# Patient Record
Sex: FEMALE | Race: WHITE
[De-identification: names, ages, dates, MRNs, and addresses within clinical notes are randomized per-mention and may not be internally consistent; named-entity substitution may affect disease eponyms.]

---

## 2018-06-02 ENCOUNTER — HOSPITAL ENCOUNTER (EMERGENCY)
Dept: HOSPITAL 75 - ER | Age: 28
Discharge: LEFT BEFORE DISCHARGE | End: 2018-06-02
Payer: SELF-PAY

## 2018-06-02 VITALS — SYSTOLIC BLOOD PRESSURE: 120 MMHG | DIASTOLIC BLOOD PRESSURE: 82 MMHG

## 2018-06-02 VITALS — HEIGHT: 60 IN | BODY MASS INDEX: 30.43 KG/M2 | WEIGHT: 155 LBS

## 2018-06-02 DIAGNOSIS — F11.23: Primary | ICD-10-CM

## 2018-06-02 DIAGNOSIS — Z88.0: ICD-10-CM

## 2018-06-02 DIAGNOSIS — F12.90: ICD-10-CM

## 2018-06-02 DIAGNOSIS — F17.210: ICD-10-CM

## 2018-06-02 LAB
APTT PPP: YELLOW S
BACTERIA #/AREA URNS HPF: (no result) /HPF
BARBITURATES UR QL: NEGATIVE
BENZODIAZ UR QL SCN: POSITIVE
BILIRUB UR QL STRIP: NEGATIVE
COCAINE UR QL: NEGATIVE
FIBRINOGEN PPP-MCNC: (no result) MG/DL
GLUCOSE UR STRIP-MCNC: NEGATIVE MG/DL
KETONES UR QL STRIP: NEGATIVE
LEUKOCYTE ESTERASE UR QL STRIP: NEGATIVE
METHADONE UR QL SCN: NEGATIVE
METHAMPHETAMINE SCREEN URINE S: NEGATIVE
NITRITE UR QL STRIP: NEGATIVE
OPIATES UR QL SCN: NEGATIVE
OXYCODONE UR QL: NEGATIVE
PH UR STRIP: 7 [PH] (ref 5–9)
PROPOXYPH UR QL: NEGATIVE
PROT UR QL STRIP: NEGATIVE
RBC #/AREA URNS HPF: (no result) /HPF
SP GR UR STRIP: 1.01 (ref 1.02–1.02)
SQUAMOUS #/AREA URNS HPF: (no result) /HPF
TRICYCLICS UR QL SCN: NEGATIVE
UROBILINOGEN UR-MCNC: NORMAL MG/DL
WBC #/AREA URNS HPF: (no result) /HPF

## 2018-06-02 PROCEDURE — 99283 EMERGENCY DEPT VISIT LOW MDM: CPT

## 2018-06-02 PROCEDURE — 80306 DRUG TEST PRSMV INSTRMNT: CPT

## 2018-06-02 PROCEDURE — 81000 URINALYSIS NONAUTO W/SCOPE: CPT

## 2018-06-02 NOTE — XMS REPORT
Transition of Care/Referral Summary

 Created on: 2113



ISA SILVERIO

External Reference #: [id number]

: 1990

Sex: Female



Demographics







 Address  [address] [place]  [address]

 

 Home Phone  [phone]

 

 Preferred Language  English

 

 Marital Status  Single

 

 [employer] Affiliation  Mos[employer]

 

 Race  White

 

 Ethnic Group  Not  or 





Author







 Author  [facility], [facility], [facility]

 

 Organization  [facility] [place], [facility]

 

 Address  Unknown

 

 Phone  Unavailable







Care Team Providers







 Care Team Member Name  Role  Phone

 

 Ira Covarrubias  PCP  [phone]







Encounter





VC FIN [id number] Date(s): 5/11/15 - 5/11/15

[facility], [address] [address] [phone]

Discharge Diagnosis: VOMITING ALONE

Discharge Diagnosis: Epilepsy

Discharge Diagnosis: Abdominal pain

Discharge Disposition: 01-Home or Self Care

Attending Physician: Ira Covarrubias DO

Admitting Physician: Ira Covarrubias DO





Vital Signs







 



  Most recent to   1



  oldest [Reference 



  Range]: 

 

 



  Peripheral Pulse   104 bpm



  Rate [ bpm]   *HI*



   (5/11/15 3:03 PM)

 

 



  Blood Pressure   112/78 mmHg



  [/60-90 mmHg]   (5/11/15 3:03 PM)

 

 



  SpO2   98 %



   (5/11/15 3:03 PM)







Problem List







    



  Condition   Effective Dates   Status   Health Status   Informant

 

    



  Endometriosis(Confir    Active    patient



  med)    

 

    



  Epilepsy(Confirmed)    Active    patient

 

    



  Folliculitis(Confirm    Active  



  ed)    

 

    



  Obesity(Confirmed)    Active    patient

 

    



  Tobacco    Active    patient



  user(Confirmed)    







Allergies, Adverse Reactions, Alerts







   



  Substance   Reaction   Severity   Status

 

   



  acetaminophen   NAUSEA/VOMITING/ITCHING    Active

 

   



  amoxicillin   Adverse Reaction    Active



   UNKNOWN  

 

   



  HYDROcodone   NAUSEA/VOMITING/ITCHING    Active

 

   



  penicillin   Adverse Reaction    Active

 

   



  traMADol     Active







Medications





clonazePAM 1 mg oral tablet

1 tabs, Oral, BID, # 60 tabs, 1 Refill(s)

Start Date: 5/11/15

Stop Date: 7/10/15

Status: Ordered



Depakote

Oral, TID, 0 Refill(s)

Start Date: 2/2/15

Status: Ordered



Flomax 0.4 mg oral capsule

0.4 mg 1 caps, Oral, Daily, # 30 caps, 0 Refill(s), Pharmacy: [facility]/pharmacy #
[id number], 1 caps Oral Daily

Start Date: 5/21/15

Status: Ordered



Flonase 50 mcg/inh nasal spray

2 sprays, Nasal, Daily, in each nostril

Start Date: 2/9/15

Status: Ordered



ibuprofen 800 mg oral tablet

1 tabs, Oral, TID, as needed for pain, # 30 tabs, 0 Refill(s), SUPERVISING 
DEVIN GUZMAN

Start Date: 2/2/15

Stop Date: 2/12/15

Status: Ordered



Norco 7.5 mg-325 mg oral tablet

1 tabs, Oral, q6hr, as needed for pain, # 30 tabs, 0 Refill(s)

Start Date: 5/22/15

Status: Ordered



Norco 7.5 mg-325 mg oral tablet

1 tabs, Oral, q6hr, as needed for pain, # 30 tabs, 0 Refill(s)

Start Date: 5/19/15

Status: Ordered



promethazine 12.5 mg oral tablet

12.5 mg 1 tabs, Oral, q6hr, as needed for nausea/vomiting, # 60 tabs, 0 Refill(s
), Pharmacy: [facility]/pharmacy #[id number], 1 tabs Oral q6hr,PRN:as needed for nausea/
vomiting

Start Date: 5/19/15

Stop Date: 16

Status: Ordered



Zofran ODT 8 mg oral tablet, disintegrating

8 mg 1 tabs, Oral, q8hr, as needed for nausea/vomiting, # 30 tabs, 1 Refill(s), 
Pharmacy: [facility]/pharmacy #[id number], 1 tabs Oral q8hr,PRN:as needed for nausea/
vomiting

Start Date: 5/19/15

Status: Ordered



Results





Hematology





 



  Most recent to   1



  oldest [Reference 



  Range]: 

 

 



  WBC [4.8-10.8   8.7 10*3/uL



  10*3/uL]   (5/11/15 3:32 PM)

 

 



  RBC [4.00-5.20   4.44 10*6/uL



  10*6/uL]   (5/11/15 3:32 PM)

 

 



  Hgb [12.0-16.0   13.5 gm/dL



  gm/dL]   (5/11/15 3:32 PM)

 

 



  Hct [37.0-47.0 %]   39.2 %



   (5/11/15 3:32 PM)

 

 



  MCV [82.0-99.0 fL]   88.3 fL



   (5/11/15 3:32 PM)

 

 



  MCH [27.0-32.0 pg]   30.4 pg



   (5/11/15 3:32 PM)

 

 



  MCHC [32.0-36.0   34.4 gm/dL



  gm/dL]   (5/11/15 3:32 PM)

 

 



  RDW [11.5-14.5 %]   12.7 %



   (5/11/15 3:32 PM)

 

 



  Platelet [150-400   392 10*3/uL



  10*3/uL]   (5/11/15 3:32 PM)

 

 



  MPV [8.8-14.8 fL]   10.8 fL



   (5/11/15 3:32 PM)

 

 



  Immature   0.1 %



  Granulocytes   (5/11/15 3:32 PM)



  [0.0-1.0 %] 

 

 



  Neutrophils [51-75   39 %



  %]   *LOW*



   (5/11/15 3:32 PM)

 

 



  Lymphocytes [20-46   52 %



  %]   *HI*



   (5/11/15 3:32 PM)

 

 



  Monocytes [4-11 %]   7 %



   (5/11/15 3:32 PM)

 

 



  Eosinophils [0-4 %]   2 %



   (5/11/15 3:32 PM)

 

 



  Basophils [0-2 %]   0 %



   (5/11/15 3:32 PM)

 

 



  Neutro Absolute   3.41 10*3



  [1.90-7.00 10*3]   (5/11/15 3:32 PM)

 

 



  Lymph Absolute   4.50 10*3



  [0.80-3.30 10*3]   *HI*



   (5/11/15 3:32 PM)

 

 



  Mono Absolute   0.59 10*3



  [0.30-1.00 10*3]   (5/11/15 3:32 PM)

 

 



  Eos Absolute   0.14 10*3



  [0.00-0.50 10*3]   (5/11/15 3:32 PM)

 

 



  Baso Absolute   0.03 10*3



  [0.00-0.20 10*3]   (5/11/15 3:32 PM)







Chemistry





 



  Most recent to   1



  oldest [Reference 



  Range]: 

 

 



  Sodium Lvl [135-144   138 mEq/L



  mEq/L]   (5/11/15 3:32 PM)

 

 



  Potassium Lvl   4.2 mEq/L 1



  [3.5-5.2 mEq/L]   (5/11/15 3:32 PM)

 

 



  Chloride [   107 mEq/L



  mEq/L]   (5/11/15 3:32 PM)

 

 



  CO2 [22-31 mEq/L]   21 mEq/L



   *LOW*



   (5/11/15 3:32 PM)

 

 



  AGAP [3-20]   10



   (5/11/15 3:32 PM)

 

 



  BUN [7-19 mg/dL]   10 mg/dL



   (5/11/15 3:32 PM)

 

 



  Glucose Lvl [70-99   108 mg/dL



  mg/dL]   *HI*



   (5/11/15 3:32 PM)

 

 



  Creatinine Lvl   0.75 mg/dL



  [0.57-1.11 mg/dL]   (5/11/15 3:32 PM)

 

 



  eGFR [>60 mL/min]   >60 mL/min 2



   (5/11/15 3:32 PM)

 

 



  Calcium Lvl   9.2 mg/dL



  [8.9-10.5 mg/dL]   (5/11/15 3:32 PM)

 

 



  Albumin Lvl [3.5-5.0   3.7 gm/dL



  gm/dL]   (5/11/15 3:32 PM)

 

 



  Total Protein   6.2 gm/dL



  [6.4-8.3 gm/dL]   *LOW*



   (5/11/15 3:32 PM)

 

 



  Globulin [1.8-4.0   2.5 gm/dL



  gm/dL]   (5/11/15 3:32 PM)

 

 



  ALT [0-55 U/L]   13 U/L



   (5/11/15 3:32 PM)

 

 



  AST [5-34 U/L]   22 U/L



   (5/11/15 3:32 PM)

 

 



  Alk Phos [   61 U/L



  U/L]   (5/11/15 3:32 PM)

 

 



  Bili Total [0.2-1.2   0.3 mg/dL



  mg/dL]   (5/11/15 3:32 PM)

 

 



  Lipase Lvl [8-78   43 U/L



  U/L]   (5/11/15 3:32 PM)







1Result Comment: Specimen moderately hemolyzed. LDH, AST, Potassium and Direct

Bilirubin may be affected.



2Result Comment: Multiply eGFR results by 1.21 for  race.



Immunizations





No data available for this section



Procedures





No data available for this section



Social History







 



  Social History Type   Response

 

 



  Smoking Status   Current some day smoker; Type: Cigarettes







Assessment and Plan

Extracted from:





  



  Title: Ambulatory Patient Education   Author: Ira Covarrubias DO   Date: 5/12/15









 Family Medicine

Abdominal Pain, Women

Abdominal (stomach, pelvic, or belly ) pain can be caused by many things. It is 
important to tell your doctor:





  The location of the pain. 



  Does it come and go or is it present all the time? 



  Are there things that start the pain (eating certain foods, exercise)? 



  Are there other symptoms associated with the pain (fever, nausea, vomiting, 
diarrhea)? 

All of this is helpful to know when trying to find the cause of the pain. 



CAUSES



  Stomach: virus or bacteria infection, or ulcer. 



  Intestine: appendicitis (inflamed appendix ), regional ileitis (Crohn's 
disease ), ulcerative colitis (inflamed colon ), irritable bowel syndrome, 
diverticulitis (inflamed diverticulum of the colon ), or cancer of the stomach 
or intestine. 



  Gallbladder disease or stones in the gallbladder. 



  Kidney disease, kidney stones, or infection. 



  Pancreas infection or cancer. 



  Fibromyalgia (pain disorder ). 



  Diseases of the female organs: 





   Uterus: fibroid (non-cancerous ) tumors or infection. 





   Fallopian tubes: infection or tubal pregnancy. 





   Ovary: cysts or tumors. 





   Pelvic adhesions (scar tissue ). 





   Endometriosis (uterus lining tissue growing in the pelvis and on the pelvic 
organs ). 





   Pelvic congestion syndrome (female organs filling up with blood just before 
the menstrual period ). 





   Pain with the menstrual period. 





   Pain with ovulation (producing an egg ). 





   Pain with an IUD (intrauterine device, birth control ) in the uterus. 





   Cancer of the female organs. 



  Functional pain (pain not caused by a disease, may improve without treatment 
). 



  Psychological pain. 



  Depression. 



DIAGNOSIS

Your doctor will decide the seriousness of your pain by doing an examination. 



  Blood tests. 



  X-rays. 



  Ultrasound. 



  CT scan (computed tomography, special type of X-ray ). 



  MRI (magnetic resonance imaging ). 



  Cultures, for infection. 



  Barium enema (dye inserted in the large intestine, to better view it with X-
rays ). 



  Colonoscopy (looking in intestine with a lighted tube ). 



  Laparoscopy (minor surgery, looking in abdomen with a lighted tube ). 



  Major abdominal exploratory surgery (looking in abdomen with a large 
incision ).  



TREATMENT

The treatment will depend on the cause of the pain.  



  Many cases can be observed and treated at home. 



  Over-the-counter medicines recommended by your caregiver. 



  Prescription medicine. 



  Antibiotics, for infection. 



  Birth control pills, for painful periods or for ovulation pain. 



  Hormone treatment, for endometriosis. 



  Nerve blocking injections. 



  Physical therapy. 



  Antidepressants. 



  Counseling with a psychologist or psychiatrist. 



  Minor or major surgery. 



HOME CARE INSTRUCTIONS



  Do not take laxatives, unless directed by your caregiver. 



  Take over-the-counter pain medicine only if ordered by your caregiver. Do 
not take aspirin because it can cause an upset stomach or bleeding. 



  Try a clear liquid diet (broth or water) as ordered by your caregiver. 
Slowly move to a bland diet, as tolerated, if the pain is related to the 
stomach or intestine. 



  Have a thermometer and take your temperature several times a day, and record 
it. 



  Bed rest and sleep, if it helps the pain. 



  Avoid sexual intercourse, if it causes pain. 



  Avoid stressful situations. 



  Keep your follow-up appointments and tests, as your caregiver orders. 



  If the pain does not go away with medicine or surgery, you may try: 





   Acupuncture. 





   Relaxation exercises (yoga, meditation). 





   Group therapy. 





   Counseling.  



SEEK MEDICAL CARE IF:



  You notice certain foods cause stomach pain. 



  Your home care treatment is not helping your pain. 



  You need stronger pain medicine. 



  You want your IUD removed. 



  You feel faint or lightheaded. 



  You develop nausea and vomiting. 



  You develop a rash. 



  You are having side effects or an allergy to your medicine. 



SEEK IMMEDIATE MEDICAL CARE IF:



  Your pain does not go away or gets worse. 



  You have a fever. 



  Your pain is felt only in portions of the abdomen. The right side could 
possibly be appendicitis. The left lower portion of the abdomen could be 
colitis or diverticulitis. 



  You are passing blood in your stools (bright red or black tarry stools, with 
or without vomiting). 



  You have blood in your urine. 



  You develop chills, with or without a fever. 



  You pass out. 



MAKE SURE YOU:



  Understand these instructions.  



  Will watch your condition. 



  Will get help right away if you are not doing well or get worse. 



Document Released: 10/14/2008 Document Revised: 2013 Document Reviewed: 

[facility] Patient Information 2014 [employer] [facility].





No follow up information was provided.





Extracted from:





  



  Title: OV: seizure, abd pain   Author: Ira Covarrubias DO   Date: 5/11/15









  Assessment/Plan

 1.Abdominal pain  1L NS w/ 4mg Zofran given IV in the office. She reported 
no improvement in her symptoms following this. In fact, her belly was starting 
to hurt worse again. Evaluation at ER was discussed, but she really didn't want 
to go. RX for Norco written. Will check CBC, CMP, and lipase today and call her 
w/ results and plan in the AM. If no evidence of pancreatitis, will consider CT 
of abd as next step.

  Ordered:

 Office Visit Level 4 Est 62625

 Return to Clinic 

 2.VOMITING ALONE  Zofran 4mg IV in office.

  Ordered:

 Office Visit Level 4 Est 17710

 Return to Clinic 

 3.Epilepsy  She reports having plenty of Depakote on hand. I refilled 
Klonopin. Need records from [place] [facility] before I can send her on to local 
epileptologist.

  Ordered:

 Office Visit Level 4 Est 27657

 Return to Clinic 

 Orders:  clonazePAM, 1 tabs, Oral, BID, # 60 tabs, 1 Refill(s)

  HYDROcodone-acetaminophen, 1 tabs, Oral, q6hr, as needed for pain, # 20 tabs, 
0 Refill(s)







Referrals to Other Providers





Referred by: Ira Covarrubias DO

## 2018-06-02 NOTE — XMS REPORT
Continuity of Care Document

 Created on: 2018



Amie Tavarez

External Reference #: [id number]

: 1990

Sex: Female



Demographics







 Address  [address] [place]  [address]

 

 Home Phone  [phone] x

 

 Preferred Language  Unknown

 

 Marital Status  Unknown

 

 Sikh Affiliation  Unknown

 

 Race  Unknown

 

 Ethnic Group  Unknown





Author







 Author  [facility]

 

 Organization  [facility]

 

 Address  Unknown

 

 Phone  Unavailable



              



Allergies

      





 Active            Description            Code            Type            
Severity            Reaction            Onset            Reported/Identified   
         Relationship to Patient            Clinical Status        

 

 Yes            AMOXICILLIN            [id number]            DRUG            N/A 
           N/A                                                            

 

 Yes            CODEINE            [id number]            DRUG            N/A     
       N/A                                                            

 

 Yes            PENICILLIN            [id number]            CLASS            N/A 
           N/A                                                            

 

 Yes            PENICILLINS (CLASS)            [id number]            CLASS       
     N/A            N/A                                                        
    

 

 Yes            Amoxicillin                         Drug Allergy               
          Adverse Reaction                         2011                  
                

 

 Yes            Amoxicillin                         Drug Allergy            N/A
            Adverse Reaction                         2011                
                  

 

 Yes            Penicillins                         Drug Allergy               
          Adverse Reaction                         2011                  
                

 

 Yes            Penicillins                         Drug Allergy            N/A
            Adverse Reaction                         2011                
                  

 

 Yes            No Allergy Information                         Drug Allergy    
                                               2011                      
            

 

 Yes            No Allergy Information                         Drug Allergy    
        N/A            N/A                         2013                  
                

 

 Yes            acetaminophen                         NKMA            N/A      
      NAUSEA/VOMITING/ITCHING                         2014               
                   

 

 Yes            amoxicillin                         NKMA            N/A        
    Adverse Reaction UNKNOWN                         2014                
                  

 

 Yes            HYDROcodone                         NKMA            N/A        
    NAUSEA/VOMITING/ITCHING                         2014                 
                 

 

 Yes            penicillin                         NKMA            N/A         
   Adverse Reaction                         2014                         
         

 

 Yes            traMADol                         NKMA            N/A            
N/A                         2014                                  

 

 Yes            ketorolac            ketorolac            Drug Allergy         
   Unknown            HIVES                         2014                 
                 

 

 Yes            acetaminophen            acetaminophen            Drug Allergy 
           Mild            HIVES                         2015            
                      

 

 Yes            hydrocodone bit            hydrocodone bit            Drug 
Allergy            Mild            HIVES                         2015    
                              

 

 Yes            Amoxicillin            amoxicillin            Drug Allergy     
       Unknown            HIVES/SWELLING                         2015    
                              

 

 Yes            Penicillins            Penicillins            Drug Allergy     
       Unknown            HIVES                         2015             
                     

 

 Yes            tramadol            tramadol            Drug Allergy            
Unknown            SEIZURES                         2015                 
                 



                                                          



Medications

      



There is no data.                  



Problems

      





 Date Dx Coded            Attending            Type            Code            
Diagnosis            Diagnosed By        

 

 2013            Enrique Esparza MD            Final            305.1     
       TOBACCO USE DISORDER                     

 

 2013            Enrique Esparza MD            Final            616.4     
       VULVAR ABSCESS NEC                     

 

 2013            Ha Blake MD            Final            305.1    
        TOBACCO USE DISORDER                     

 

 2013            Ha Blake MD            Final            709.9    
        SKIN DISORDER NOS                     

 

 2013            Ha Blake MD            Admitting            782.2
            LOCAL SUPERF SWELLING                     

 

 2013            Juan C Toney MD            Final            
305.1            TOBACCO USE DISORDER                     

 

 2013            Juan C Toney MD            Final            
614.8            FE PELV INFLAM DIS NEC                     

 

 2013            Juan C Toney MD            Final            
623.5            NONINF VAG LEUKORRHEA                     

 

 2013            Juan C Toney MD            Admitting            
625.9            FE GENITAL SYMPTOMS NOS                     

 

 2013            Juan C Toney MD            Final            
789.09            ABDOMINAL PAIN-SITE NEC                     

 

 2013            Karthik Campos DO            Final            305.1  
          TOBACCO USE DISORDER                     

 

 2013            Karthik Campos DO            Final            682.6  
          LEG CELLULITIS                     

 

 2013            Karthik Campos DO            Admitting            
782.2            LOCAL SUPERF SWELLING                     

 

 2013            Karthik Campos DO            Final            785.0  
          TACHYCARDIA NOS                     

 

 2015            Karthik Justin MD            Final            305.1
            TOBACCO USE DISORDER                     

 

 2015            Karthik Justin MD            Final            723.1
            CERVICALGIA                     

 

 2015            Karthik Justin MD            Reason            
724.5            BACKACHE, UNSPECIFIED                     

 

 2015            Karthik Justin MD            Final            848.9
            UNSPECIFIED SITE OF SPRAIN AND STRAIN                     

 

 2015            Karthik Justin MD            Final            924.9
            CONTUSION OF UNSPECIFIED SITE                     



                                                      



Procedures

      





 Code            Description            Performed By            Performed On   
     

 

             86.04                                  OTHER SKIN   SUBQ I   D    
                  Leroy BURTON, Traci L            05/10/2013        



                  



Results

      





 Test            Result            Range        









 GLUCOSE (POC) - 05/10/13 10:39         









 GLUCOSE (POC)            93 mg/dL            70-99        









 CHEM/HEM PROFILE-BEDSIDE - 14 00:30         









 POTASSIUM            3.7 mmol/L            3.5-5.3        

 

 METHOD            Bedside                      

 

 ANION GAP            11 mmol/L            10-20        

 

 METHOD            Bedside                      

 

 GLUCOSE            88 mg/dL            70-99        

 

 BLOOD UREA NITROGEN            9 mg/dL            7-20        

 

 CREATININE            0.9 mg/dL            0.6-1.0        

 

 HEMOGLOBIN            12.6 gm/dL            12.0-16.0        

 

 HEMATOCRIT            37.0 %            37.0-47.0        

 

 SODIUM            137 mmol/L            135-148        

 

 CHLORIDE            108 mmol/L                    

 

 CARBON DIOXIDE            23 mmol/L            21-32        

 

 CALCIUM IONIZED            4.6 mg/dL            4.5-5.3        









 CBC W/DIFF - 14 00:35         









 EOSINOPHIL #            0.1 k/cumm            0.1-0.5        

 

 EOSINOPHIL %            1 %            2-4        

 

 GRANULOCYTE #            4.6 k/cumm            2.0-9.0        

 

 GRANULOCYTE %            50 %            50-75        

 

 LYMPHOCYTE #            3.9 k/cumm            1.0-4.0        

 

 LYMPHOCYTE %            43 %            20-30        

 

 MEAN CELL HGB            31.0 pg            27.0-33.0        

 

 MEAN CELL HGB CONCENTRATION            34.9 g/dL            32.0-37.0        

 

 MEAN CELL VOLUME            89.1 fl            80.0-100.0        

 

 MONOCYTE #            0.5 k/cumm            0.1-1.0        

 

 MONOCYTE %            6 %            4-6        

 

 RED BLOOD CELL            4.67 m/cumm            4.00-6.00        

 

 RED CELL DISTRIBUTION WIDTH            12.0 %            11.0-15.6        

 

 WHITE BLOOD CELL            9.1 k/cumm            5.0-10.0        

 

 HEMOGLOBIN            14.5 gm/dL            12.0-16.0        

 

 HEMATOCRIT            41.6 %            37.0-47.0        

 

 PLATELET COUNT            359 k/cumm            150-400        









 HEPATIC FUNCTION PANEL - 14 00:35         









 BILI UNCONJUGATED            0.2 mg/dL            0.0-0.7        

 

 AST/SGOT            11 Units/L            10-37        

 

 ALT/SGPT            21 Units/L            < 66        

 

 TOTAL PROTEIN            7.1 gm/dL            6.4-8.2        

 

 ALBUMIN            3.4 gm/dL            3.4-5.0        

 

 BILI TOTAL            0.3 mg/dL            0.0-1.0        

 

 ALKALINE PHOSPHATASE TOTAL            66 IU/L                    

 

 BILI CONJUGATED            0.1 mg/dL            0.0-0.3        









 URINALYSIS, ROUTINE - 14 00:35         









 UA LEUKOCYTE ESTERASE DIPSTICK            NEGATIVE             NEGATIVE        

 

 UA NITRITE DIPSTICK            NEGATIVE             NEGATIVE        

 

 UA PROTEIN DIPSTICK            NEGATIVE             NEGATIVE        

 

 UA GLUCOSE DIPSTICK            NEGATIVE             NEGATIVE        

 

 UA KETONE DIPSTICK            NEGATIVE             NEGATIVE        

 

 UA UROBILINOGEN DIPSTICK            NORMAL             NORMAL        

 

 UA BILIRUBIN DIPSTICK            NEGATIVE             NEGATIVE        

 

 UA BLOOD DIPSTICK            NEGATIVE             NEGATIVE        

 

 UA SPECIFIC GRAVITY            1.005             1.015-1.025        

 

 UR PH            7.0             5.0-7.0        









 UR PREGNANCY TEST - 14 00:35         









 UR PREGNANCY TEST            NEGATIVE             NEGATIVE        









 URINALYSIS, ROUTINE - 04/01/15 14:04         









 UA LEUKOCYTE ESTERASE DIPSTICK            NEGATIVE             NEGATIVE        

 

 UA NITRITE DIPSTICK            NEGATIVE             NEGATIVE        

 

 UA PROTEIN DIPSTICK            TRACE             NEGATIVE        

 

 UA GLUCOSE DIPSTICK            NEGATIVE             NEGATIVE        

 

 UA KETONE DIPSTICK            TRACE             NEGATIVE        

 

 UA UROBILINOGEN DIPSTICK            NORMAL             NORMAL        

 

 UA BILIRUBIN DIPSTICK            1+             NEGATIVE        

 

 UA BLOOD DIPSTICK            2+             NEGATIVE        

 

 UA SPECIFIC GRAVITY            1.025             1.015-1.025        

 

 UR PH            6.0             5.0-7.0        









 UA MICROSCOPIC - 04/01/15 14:04         









 UA BACTERIA            2+             NEGATIVE        

 

 UA EPITHELIAL CELLS            2+ epi/hpf            0 - 1+        

 

 UA RBC            0-3 rbc/hpf            0 - 3        

 

 UA VOLUME FOR EXAM            12.0 mL            (12mL STD)        

 

 UA WBC            2-5 wbc/hpf            0 - 5        









 UR PREGNANCY TEST - 04/01/15 14:06         









 UR PREGNANCY TEST            NEGATIVE             NEGATIVE        









 CBC W/DIFF - 05/03/15 12:06         









 BASOPHIL #            0.0 k/cumm            0.0-0.2        

 

 BASOPHIL %            1 %            0-1        

 

 EOSINOPHIL #            0.1 k/cumm            0.1-0.5        

 

 EOSINOPHIL %            1 %            2-4        

 

 GRANULOCYTE #            3.7 k/cumm            2.0-9.0        

 

 GRANULOCYTE %            54 %            50-75        

 

 LYMPHOCYTE #            2.6 k/cumm            1.0-4.0        

 

 LYMPHOCYTE %            38 %            20-30        

 

 MEAN CELL HGB            30.5 pg            27.0-33.0        

 

 MEAN CELL HGB CONCENTRATION            35.3 g/dL            32.0-37.0        

 

 MEAN CELL VOLUME            86.3 fl            80.0-100.0        

 

 MONOCYTE #            0.4 k/cumm            0.1-1.0        

 

 MONOCYTE %            6 %            4-6        

 

 RED BLOOD CELL            4.98 m/cumm            4.00-6.00        

 

 RED CELL DISTRIBUTION WIDTH            12.5 %            11.0-15.6        

 

 WHITE BLOOD CELL            6.9 k/cumm            5.0-10.0        

 

 HEMOGLOBIN            15.2 gm/dL            12.0-16.0        

 

 HEMATOCRIT            43.0 %            37.0-47.0        

 

 PLATELET COUNT            371 k/cumm            150-400        









 HEPATIC FUNCTION PANEL - 05/03/15 12:06         









 BILI UNCONJUGATED            0.2 mg/dL            0.0-0.7        

 

 AST/SGOT            31 Units/L            10-37        

 

 ALT/SGPT            22 Units/L            < 66        

 

 TOTAL PROTEIN            7.4 gm/dL            6.4-8.2        

 

 ALBUMIN            3.5 gm/dL            3.4-5.0        

 

 BILI TOTAL            0.3 mg/dL            0.0-1.0        

 

 ALKALINE PHOSPHATASE TOTAL            75 IU/L                    

 

 BILI CONJUGATED            < 0.1 mg/dL            0.0-0.3        









 PROLACTIN - 05/03/15 12:06         









 PROLACTIN            7.5 ng/mL            1.8-29.2        









 CHEM/HEM PROFILE-BEDSIDE - 05/03/15 12:08         









 POTASSIUM            3.9 mmol/L            3.5-5.3        

 

 METHOD            Bedside                      

 

 ANION GAP            19 mmol/L            10-20        

 

 METHOD            Bedside                      

 

 GLUCOSE            108 mg/dL            70-99        

 

 BLOOD UREA NITROGEN            10 mg/dL            7-20        

 

 CREATININE            0.7 mg/dL            0.6-1.0        

 

 HEMOGLOBIN            15.0 gm/dL            12.0-16.0        

 

 HEMATOCRIT            44.0 %            37.0-47.0        

 

 SODIUM            141 mmol/L            135-148        

 

 CHLORIDE            104 mmol/L                    

 

 CARBON DIOXIDE            23 mmol/L            21-32        

 

 CALCIUM IONIZED            4.9 mg/dL            4.5-5.3        



                                        



Encounters

      





 ACCT No.            Visit Date/Time            Discharge            Status    
        Pt. Type            Provider            Facility            Loc./Unit  
          Complaint        

 

 [id number]            2013 20:38:00            2013 21:40:00     
       DIS            Emergency            Karthik Campos DO            [facility]                     

 

 [id number]            2013 09:12:00            2013 12:05:00     
       DIS            Emergency            Juan C Toney MD            
[facility]                     

 

 [id number]            2013 12:25:00            2013 13:13:00     
       DIS            Emergency            Ha Blake MD            [facility]                     

 

 [id number]            2013 17:41:00            2013 18:10:00     
       DIS            Emergency            Enrique Esparza MD            [facility]                     

 

 [id number]            2014 15:54:00            2014 17:48:00    
        DIS            Emergency                                               
             

 

 [id number]            2015 17:36:00            2015 21:21:00    
        DIS            Emergency            Karthik Justin MD            
[facility] ED            back pain        

 

 [id number]            2017 19:53:51            2017 23:59:59          
  CLS            Outpatient            Davidsno Howard                       
                        

 

 [id number]            2018 13:29:31                                      
Document Registration                                                          
  

 

 [id number]            2018 13:25:18                                      
Document Registration                                                          
  

 

 [id number]            2018 22:58:21                                      
Document Registration                                                          
  

 

 [id number]            2018 18:28:25                                      
Document Registration                                                          
  

 

 [id number]            2018 17:22:01                                      
Document Registration                                                          
  

 

 [id number]            2017 09:42:53                                      
Document Registration                                                          
  

 

 [id number]            2017 09:33:49                                      
Document Registration                                                          
  

 

 [id number]            2015 11:29:00            2015 13:53:00    
        DIS            Emergency            Joe BURTON, [employer]                     

 

 [id number]            2015 13:04:00            2015 15:43:00    
        DIS            Emergency            [facility]                     

 

 [id number]            2014 23:37:00            2014 02:30:00    
        DIS            Emergency            Zeeshan BURTON, Sheng [facility]            [facility]                     

 

 [id number]            05/10/2013 08:59:00            05/10/2013 10:41:00    
        DIS            Emergency            Leroy BURTON, Traci [facility]                     

 

 [id number]            2013 07:31:00            2013 07:59:00    
        DIS            Emergency            [facility]                     

 

 [id number]            2015 01:59:05                         [place]            
Document Registration                                                          
  

 

 [id number]            2014 15:04:00            2014 23:59:59         
   CLS            Outpatient

## 2018-06-02 NOTE — ED PSYCHOSOCIAL
General


Chief Complaint:  Substance Abuse


Stated Complaint:  WITHDRAWAL SYMPTOMS


Nursing Triage Note:  


PT TO ED 8 W/ C/O WITHDRAWEL SYMPTOMS ONSET TODAY AFTER STOPPING TAKING 


NARCOTICS YESTERDAY.  PT REPORTS HAS BEEN ADDICTED TO NARCOTICS SINCE SHE WAS 


12.  ALSO REPORTS USE OF METH (WHICH SHE DOES NOT WANT FAMILY TO KNOW) AND 


MARIJUANA.  NO OTHER C/O VOICED


Source:  patient


Exam Limitations:  no limitations





History of Present Illness


Date Seen by Provider:  Jun 2, 2018


Time Seen by Provider:  16:23


Initial Comments


PT ARRIVES VIA POV FROM HOME WITH A MALE AND AN OLDER FEMALE


PT STATES "IM HAVING WITHDRAWL FROM O[facility]ATES"


PT STATES SHE HAS BEEN ADDICTED TO PRESCRIPTION DRUGS SINCE SHE WAS 13--USES 

OPIATES AND HAS ALSO USED BENZODIAZEPINES IN THE PAST, WHEN SHE COULD NOT GET 

OPIATES


STATES SHE LAST TOOK 1 HYDROCODONE 10 MG + 1 PERCOCET 10 MG AT 2100 LAST PM


PT ALSO HAD A BLOODY JÚNIOR LAST PM





PT DENIES EVER USING ANY OTHER DRUGS TO ME ( OTHER PEOPLE WERE PRESENT IN THE 

ROOM WHEN I HAD THIS DISCUSSION WITH PT, BUT PT DID ADMIT TO RN THAT SHE ALSO 

ROUTINELY ABUSES METH AND THC (WHEN OTHER PEOPLE WHO CAME WITH HER,  WERE NOT 

PRESENT) BUT DID NOT WANT THE PEOPLE THAT ARE IN ROOM WITH HER NOW TO KNOW 

THIS. 





PT STATES TODAY SHE HAS BEEN HAVING NAUSEA AND DIARRHEA


LEGS ACHE


HAS BEEN HAVING HOT AND COLD SWEATS TODAY


HAS HAD ALL THESE SAME SYMPTOMS





PT STATES SHE HAS "CALLED EVERY PLACE IN [place]" AND STATES "NO ONE WILL TAKE 

HER, BECAUSE SHE DOES NOT HAVE INSURANCE"


STATES 'SOMEONE TOLD ME TO COME HERE AND YOU COULD GET ME INTO [facility]" BUT 

PT HAS NOT ATTEMPTED TO CONTACT [facility] AT ANY TIME


THERE IS NOTHING NEW WITH HER ONGOING ADDICTION ISSUES,  PT STATES "I'M JUST 

TIRED OF IT" STATES "I'M TIRED OF WAKING UP EVERY MORNING AND THE ONLY THING I 

THINK ABOUT IS DRUGS" 


PT HAS BEEN IN DRUG REHAB BEFORE--LAST TIME WAS IN 2015 IN [facility] IN 

[place], [place]





PT HAS BEEN GOING TO THERAPY AT A MENTAL HEALTH CLINIC IN [place], BUT ONLY 

STARTED SEEING THEM LAST MONTH FOR DRUG COUNSELING, AND ONLY HAD ONE VISIT. 

NEXT APPOINTMENT IS 06/05/18





EXPLAINED TO PT AND PEOPLE IN THE ROOM THAT OUR ROLE IN ER WAS TO DO A MEDICAL 

SCREENING AND EXPLAINED TO THEM WHAT THIS ENTAILED. ALSO EXPLAINED TO THEM THAT 

WE DID NOT HAVE A "DETOX" OR DRUG REHAB UNIT HERE IN THIS HOSPITAL











1644--PT JUST LEFT ER, STATING TO ANOTHER STAFF MEMBER THAT THEY WERE LEAVING 

BECAUSE I WASN'T GIVING HER ANY DRUGS ( THIS WAS NEVER EVEN BROUGHT UP IN MY 

CONVERSATION WITH THE PT )  


PT WAS GIVEN THE NUMBER FOR OUTPATIENT DRUG TREATMENT AT [facility] BY STAFF 

MEMBER.





PCP: SEES NP AT [place]





Allergies and Home Medications


Allergies


Coded Allergies:  


     Penicillins (Verified  Allergy, 1/14/14)





Home Medications


Hydrocodone Bit/Acetaminophen 1 Each Tablet, 1 EA PO Q6H PRN for MILD PAIN


   Prescribed by: CHICHO ASCENCIO on 1/14/14 1741


Trimethoprim/Sulfamethoxazole 1 Ea Tablet, 1 EA PO BID


   FOR INFECTION 


   Prescribed by: CHICHO ASCENCIO on 1/14/14 1741





Patient Home Medication List


Home Medication List Reviewed:  Yes





Constitutional:  no symptoms reported


Respiratory:  no symptoms reported


Cardiovascular:  no symptoms reported


Gastrointestinal:  see HPI, nausea


Genitourinary:  no symptoms reported


LMP:  May 1, 2018 (NORMAL. NO BIRTH CONTROL)


Musculoskeletal:  see HPI


Skin:  no symptoms reported


Psychiatric/Neurological:  See HPI





Past Medical-Social-Family Hx


Patient Social History


Alcohol Use:  Occasionally Uses


Recreational Drug Use:  Yes (METH, MARIJUANA, NARCOTICS, XANAX)


Smoking Status:  Current Everyday Smoker (< 1/2 PPD)


Type Used:  Cigarettes


Recent Foreign Travel:  No


Contact w/Someone Who Travel:  No


Recent Infectious Disease Expo:  No


Recent Hopitalizations:  No


Physical Abuse:  No


Sexual Abuse:  No


Mistreated:  No


Fear:  No





Immunizations Up To Date


Date of Influenza Vaccine:  Nov 1, 2013





Past Medical History


Surgeries:  Yes (LAPAROSCOPY FOR ENDOMETRIOSIS/PELVIC PAIN )


Respiratory:  No


Cardiac:  Yes (TACHYCARDIA)


Neurological:  Yes (PT HAS HAD SEIZURES--AFTER SHE HAD BEEN GIVEN TRAMADOL--WAS 

ON SEIZURE MEDICATION IN PAST-DEPAKOTE + ATIVAN, BUT CLAIMS HAS NOT BEEN ON 

THEM FOR OVER A YEAR, PER PT ON 06/02/18)


Pregnant:  No


Female Reproductive Disorders:  Endometriosis


Genitourinary:  No


Gastrointestinal:  No


Musculoskeletal:  No


Endocrine:  No


HEENT:  No


Cancer:  No


Psychosocial:  No


Nursing Suicide Risk Score:  0





Physical Exam


Vital Signs





Vital Signs - First Documented








 6/2/18





 16:23


 


Temp 97.6


 


Pulse 88


 


Resp 20


 


B/P (MAP) 120/82 (95)


 


Pulse Ox 99


 


O2 Delivery Room Air





Capillary Refill : Less Than 3 Seconds


General Appearance:  WD/WN, no apparent distress


Neurologic/Psychiatric:  alert, oriented x 3


Appearance/Memory:  no memory impairment, other (NO SUICIDAL OR HOMICICAL 

SUGGESTIONS)





Progress/Results/Core Measures


Results/Orders


Lab Results





Laboratory Tests








Test


 6/2/18


16:26 Range/Units


 


 


Urine Color YELLOW   


 


Urine Clarity


 SLIGHTLY


CLOUDY  





 


Urine pH 7  5-9  


 


Urine Specific Gravity 1.015 L 1.016-1.022  


 


Urine Protein NEGATIVE  NEGATIVE  


 


Urine Glucose (UA) NEGATIVE  NEGATIVE  


 


Urine Ketones NEGATIVE  NEGATIVE  


 


Urine Nitrite NEGATIVE  NEGATIVE  


 


Urine Bilirubin NEGATIVE  NEGATIVE  


 


Urine Urobilinogen NORMAL  NORMAL  MG/DL


 


Urine Leukocyte Esterase NEGATIVE  NEGATIVE  


 


Urine RBC (Auto) 1+ H NEGATIVE  


 


Urine RBC NONE   /HPF


 


Urine WBC 5-10 H  /HPF


 


Urine Squamous Epithelial


Cells 5-10 


  /HPF





 


Urine Crystals NONE   /LPF


 


Urine Bacteria TRACE   /HPF


 


Urine Casts NONE   /LPF


 


Urine Mucus NEGATIVE   /LPF


 


Urine Culture Indicated NO   


 


Urine Opiates Screen NEGATIVE  NEGATIVE  


 


Urine Oxycodone Screen NEGATIVE  NEGATIVE  


 


Urine Methadone Screen NEGATIVE  NEGATIVE  


 


Urine Propoxyphene Screen NEGATIVE  NEGATIVE  


 


Urine Barbiturates Screen NEGATIVE  NEGATIVE  


 


Ur Tricyclic Antidepressants


Screen NEGATIVE 


 NEGATIVE  





 


Urine Phencyclidine Screen NEGATIVE  NEGATIVE  


 


Urine Amphetamines Screen POSITIVE H NEGATIVE  


 


Urine Methamphetamines Screen NEGATIVE  NEGATIVE  


 


Urine Benzodiazepines Screen POSITIVE H NEGATIVE  


 


Urine Cocaine Screen NEGATIVE  NEGATIVE  


 


Urine Cannabinoids Screen NEGATIVE  NEGATIVE  








My Orders





Orders - DOMENICO SIMMONS DO


Ua Culture If Indicated (6/2/18 16:35)


Thyroid Analyzer (6/2/18 16:35)


Drug Screen Stat (Urine) (6/2/18 16:35)


Cbc With Automated Diff (6/2/18 16:35)


Comprehensive Metabolic Panel (6/2/18 16:35)


Alcohol (6/2/18 16:35)


Acetaminophen (6/2/18 16:35)


Salicylate (6/2/18 16:35)


Ekg Tracing (6/2/18 16:35)


Hcg,Qualitative Serum (6/2/18 16:35)





Vital Signs/I&O











 6/2/18





 16:23


 


Temp 97.6


 


Pulse 88


 


Resp 20


 


B/P (MAP) 120/82 (95)


 


Pulse Ox 99


 


O2 Delivery Room Air














Blood Pressure Mean:  95











Departure


Impression





 Primary Impression:  


 Illicit drug use


Disposition:  07 AGAINST MEDICAL ADVICE


Condition:  Against Medical Advice





Departure-Patient Inst.


Referrals:  


NO,LOCAL PHYSICIAN (PCP/Family)


Primary Care Physician











DOMENICO SIMMONS DO Jun 2, 2018 16:52

## 2018-06-02 NOTE — XMS REPORT
Transition of Care/Referral Summary

 Created on: 2016



ISA SILVERIO

External Reference #: [id number]

: 1990

Sex: Female



Demographics







 Address  [address] [place]  [address]

 

 Home Phone  [phone]

 

 Preferred Language  English

 

 Marital Status  Single

 

 Mu-ism Affiliation  Sabianist

 

 Race  White

 

 Ethnic Group  Not  or 





Author







 Author  [facility], [facility], [facility]

 

 Organization  [facility], [facility]

 

 Address  Unknown

 

 Phone  Unavailable







Care Team Providers







 Care Team Member Name  Role  Phone

 

 Ira Covarrubias  PCP  [phone]







Encounter





VC FIN [id number] Date(s): 5/19/15 - 5/19/15

[facility], [address] [id number] [phone]

Discharge Diagnosis: Abdomen pain

Discharge Diagnosis: Folliculitis

Discharge Disposition: 01-Home or Self Care

Attending Physician: Ira Covarrubias DO

Admitting Physician: Ira Covarrubias DO





Vital Signs







 



  Most recent to   1



  oldest [Reference 



  Range]: 

 

 



  Temperature Oral   36.3 degC



  [35.8-37.3 degC]   (5/19/15 11:21 AM)

 

 



  Apical Heart Rate   86 bpm



  [ bpm]   (5/19/15 11:21 AM)

 

 



  Blood Pressure   120/76 mmHg



  [/60-90 mmHg]   (5/19/15 11:21 AM)

 

 



  SpO2   98 %



   (5/19/15 11:21 AM)







Problem List







    



  Condition   Effective Dates   Status   Health Status   Informant

 

    



  Endometriosis(Confir    Active    patient



  med)    

 

    



  Epilepsy(Confirmed)    Active    patient

 

    



  Folliculitis(Confirm    Active  



  ed)    

 

    



  Obesity(Confirmed)    Active    patient

 

    



  Tobacco    Active    patient



  user(Confirmed)    







Allergies, Adverse Reactions, Alerts







   



  Substance   Reaction   Severity   Status

 

   



  acetaminophen   NAUSEA/VOMITING/ITCHING    Active

 

   



  amoxicillin   Adverse Reaction    Active



   UNKNOWN  

 

   



  HYDROcodone   NAUSEA/VOMITING/ITCHING    Active

 

   



  penicillin   Adverse Reaction    Active

 

   



  traMADol     Active







Medications





clonazePAM 1 mg oral tablet

1 tabs, Oral, BID, # 60 tabs, 1 Refill(s)

Start Date: 5/11/15

Stop Date: 7/10/15

Status: Ordered



Depakote

Oral, TID, 0 Refill(s)

Start Date: 2/2/15

Status: Ordered



Flomax 0.4 mg oral capsule

0.4 mg 1 caps, Oral, Daily, # 30 caps, 0 Refill(s), [id number], 1 caps Oral Daily

Start Date: 5/21/15

Status: Ordered



Flonase 50 mcg/inh nasal spray

2 sprays, Nasal, Daily, in each nostril

Start Date: 2/9/15

Status: Ordered



ibuprofen 800 mg oral tablet

1 tabs, Oral, TID, as needed for pain, # 30 tabs, 0 Refill(s), SUPERVISING 
PHYSAvis GUZMAN

Start Date: 2/2/15

Stop Date: 2/12/15

Status: Ordered



Norco 7.5 mg-325 mg oral tablet

1 tabs, Oral, q6hr, as needed for pain, # 30 tabs, 0 Refill(s)

Start Date: 5/22/15

Status: Ordered



Norco 7.5 mg-325 mg oral tablet

1 tabs, Oral, q6hr, as needed for pain, # 30 tabs, 0 Refill(s)

Start Date: 5/19/15

Status: Ordered



promethazine 12.5 mg oral tablet

12.5 mg 1 tabs, Oral, q6hr, as needed for nausea/vomiting, # 60 tabs, 0 Refill(s
), Pharmacy: [facility]/pharmacy #[id number], 1 tabs Oral q6hr,PRN:as needed for nausea/
vomiting

Start Date: 5/19/15

Stop Date: 16

Status: Ordered



Zofran ODT 8 mg oral tablet, disintegrating

8 mg 1 tabs, Oral, q8hr, as needed for nausea/vomiting, # 30 tabs, 1 Refill(s), 
Pharmacy: [facility]/pharmacy #[id number], 1 tabs Oral q8hr,PRN:as needed for nausea/
vomiting

Start Date: 5/19/15

Status: Ordered



Results





No data available for this section



Immunizations





No data available for this section



Procedures





No data available for this section



Social History







 



  Social History Type   Response

 

 



  Smoking Status   Current some day smoker; Type: Cigarettes







Assessment and Plan

Extracted from:





  



  Title: Ambulatory Patient Education   Author: Ira Covarrubias DO   Date: 5/19/15









 Family Medicine

Abdominal Pain, Women

Abdominal (stomach, pelvic, or belly ) pain can be caused by many things. It is 
important to tell your doctor:





  The location of the pain. 



  Does it come and go or is it present all the time? 



  Are there things that start the pain (eating certain foods, exercise)? 



  Are there other symptoms associated with the pain (fever, nausea, vomiting, 
diarrhea)? 

All of this is helpful to know when trying to find the cause of the pain. 



CAUSES



  Stomach: virus or bacteria infection, or ulcer. 



  Intestine: appendicitis (inflamed appendix ), regional ileitis (Crohn's 
disease ), ulcerative colitis (inflamed colon ), irritable bowel syndrome, 
diverticulitis (inflamed diverticulum of the colon ), or cancer of the stomach 
or intestine. 



  Gallbladder disease or stones in the gallbladder. 



  Kidney disease, kidney stones, or infection. 



  Pancreas infection or cancer. 



  Fibromyalgia (pain disorder ). 



  Diseases of the female organs: 





   Uterus: fibroid (non-cancerous ) tumors or infection. 





   Fallopian tubes: infection or tubal pregnancy. 





   Ovary: cysts or tumors. 





   Pelvic adhesions (scar tissue ). 





   Endometriosis (uterus lining tissue growing in the pelvis and on the pelvic 
organs ). 





   Pelvic congestion syndrome (female organs filling up with blood just before 
the menstrual period ). 





   Pain with the menstrual period. 





   Pain with ovulation (producing an egg ). 





   Pain with an IUD (intrauterine device, birth control ) in the uterus. 





   Cancer of the female organs. 



  Functional pain (pain not caused by a disease, may improve without treatment 
). 



  Psychological pain. 



  Depression. 



DIAGNOSIS

Your doctor will decide the seriousness of your pain by doing an examination. 



  Blood tests. 



  X-rays. 



  Ultrasound. 



  CT scan (computed tomography, special type of X-ray ). 



  MRI (magnetic resonance imaging ). 



  Cultures, for infection. 



  Barium enema (dye inserted in the large intestine, to better view it with X-
rays ). 



  Colonoscopy (looking in intestine with a lighted tube ). 



  Laparoscopy (minor surgery, looking in abdomen with a lighted tube ). 



  Major abdominal exploratory surgery (looking in abdomen with a large 
incision ).  



TREATMENT

The treatment will depend on the cause of the pain.  



  Many cases can be observed and treated at home. 



  Over-the-counter medicines recommended by your caregiver. 



  Prescription medicine. 



  Antibiotics, for infection. 



  Birth control pills, for painful periods or for ovulation pain. 



  Hormone treatment, for endometriosis. 



  Nerve blocking injections. 



  Physical therapy. 



  Antidepressants. 



  Counseling with a psychologist or psychiatrist. 



  Minor or major surgery. 



HOME CARE INSTRUCTIONS



  Do not take laxatives, unless directed by your caregiver. 



  Take over-the-counter pain medicine only if ordered by your caregiver. Do 
not take aspirin because it can cause an upset stomach or bleeding. 



  Try a clear liquid diet (broth or water) as ordered by your caregiver. 
Slowly move to a bland diet, as tolerated, if the pain is related to the 
stomach or intestine. 



  Have a thermometer and take your temperature several times a day, and record 
it. 



  Bed rest and sleep, if it helps the pain. 



  Avoid sexual intercourse, if it causes pain. 



  Avoid stressful situations. 



  Keep your follow-up appointments and tests, as your caregiver orders. 



  If the pain does not go away with medicine or surgery, you may try: 





   Acupuncture. 





   Relaxation exercises (yoga, meditation). 





   Group therapy. 





   Counseling.  



SEEK MEDICAL CARE IF:



  You notice certain foods cause stomach pain. 



  Your home care treatment is not helping your pain. 



  You need stronger pain medicine. 



  You want your IUD removed. 



  You feel faint or lightheaded. 



  You develop nausea and vomiting. 



  You develop a rash. 



  You are having side effects or an allergy to your medicine. 



SEEK IMMEDIATE MEDICAL CARE IF:



  Your pain does not go away or gets worse. 



  You have a fever. 



  Your pain is felt only in portions of the abdomen. The right side could 
possibly be appendicitis. The left lower portion of the abdomen could be 
colitis or diverticulitis. 



  You are passing blood in your stools (bright red or black tarry stools, with 
or without vomiting). 



  You have blood in your urine. 



  You develop chills, with or without a fever. 



  You pass out. 



MAKE SURE YOU:



  Understand these instructions.  



  Will watch your condition. 



  Will get help right away if you are not doing well or get worse. 



Document Released: 10/14/2008 Document Revised: 2013 Document Reviewed: 

[facility] Patient Information 2014 CommitChange.





No follow up information was provided.





Extracted from:





  



  Title: OV: abd pain, folliculitis   Author: Ira Covarrubias DO   Date: 5/19/15









  Assessment/Plan

 1.Abdomen pain  RUQ sono ordered

  Zofran increased 8mg q6hr prn

  Add Promethazine as needed

  Refilled Norco

  f/u pending sono results

  Ordered:

 Office Visit Level 3 Est 17752

 Return to Clinic

 US Gallbladder 

 2.Folliculitis  Bactrim DS x 10 days

  Warm water soaks and Hibclens baths to be continued.

  Ordered:

 Office Visit Level 3 Est 21068

 Return to Clinic 

 Orders:  HYDROcodone-acetaminophen, 1 tabs, Oral, q6hr, as needed for pain, # 
30 tabs, 0 Refill(s)

  ondansetron, 8 mg 1 tabs, Oral, q8hr, as needed for nausea/vomiting, # 30 tabs
, 1 Refill(s), Pharmacy: [facility]/pharmacy #[id number], 1 tabs Oral q8hr,PRN:as needed 
for nausea/vomiting

  promethazine, 12.5 mg 1 tabs, Oral, q6hr, as needed for nausea/vomiting, # 60 
tabs, 0 Refill(s), Pharmacy: [facility]/pharmacy #[id number], 1 tabs Oral q6hr,PRN:as 
needed for nausea/vomiting

  sulfamethoxazole-trimethoprim, 1 tabs, Oral, BID, X 10 days, # 20 tabs, 0 
Refill(s), Pharmacy: [facility]/pharmacy #[id number]

  US Gallbladder







Referrals to Other Providers





Referred by: Ira Covarrubias DO

## 2018-06-02 NOTE — XMS REPORT
MU2 Ambulatory Summary

 Created on: 2017



Amie Tavarez

External Reference #: [id number]

: 1990

Sex: Female



Demographics







 Address  [address] [place]  [address]

 

 Home Phone  [phone]

 

 Preferred Language  English

 

 Marital Status  Never 

 

 Yazidism Affiliation  Unknown

 

 Race  White

 

 Ethnic Group  Not  or 





Author







 Author  Davidson Howard

 

 [facility]

 

 Address  [address]

[place], [place]  [id number]



 

 Phone  [phone]







Care Team Providers







 Care Team Member Name  Role  Phone

 

 Davidson Howard  PCP  [phone]







Allergies and Adverse Reactions







 Name  Reaction  Notes

 

 PENICILLINS      

 

 tramadol      







Plan of Treatment

Not available.



Medications







 Active 

 

 Name  Start Date  Estimated Completion Date  SIG  Comments

 

 Klonopin 1 mg oral tablet        take 1 tablet by oral route daily   

 

 doxycycline hyclate 100 mg oral capsule  2017     take 1 capsule (100 mg) 
by oral route 2 times per day for 10 days   









  

 

 Name  Start Date  Expiration Date  SIG  Comments

 

 Celexa Oral Tablet 20 mg  2010  10/12/2010  take 1 tablet (20 mg) by oral 
route once daily for 30 days   

 

 Ocella Oral Tablet 3-0.03 mg  6/15/2010  2010  take 1 tablet by oral 
route once daily for 28 days   

 

 Xanax Oral Tablet 1 mg  2010  10/12/2010  take 1 tablet by oral route 2 
times a day for 30 days   

 

 Cortisporin Otic Drops, Suspension 3.5-10,000-1 mg-unit/mL-%  2010  instill 4 drops into right ear by otic route 3 times per day for 7 days   

 

 Sprintec (28) Oral Tablet 0.25-35 mg-mcg  2010  take 1 tablet 
by oral route once daily for 28 days   

 

 Flagyl Oral Tablet 500 mg  2010  take 1 tablet (500 mg) by 
oral route every 12 hours for 7 days   

 

 Ceftriaxone Injection Recon Soln 250 mg  2010  inject 250 mg 
by intramuscular route as a single dose   

 

 Doxycycline Hyclate Oral Tablet 100 mg  2010  take 1 tablet (
100 mg) by oral route every 12 hours for 14 days   

 

 Promethazine-Codeine Oral Syrup 6.25-10 mg/5 mL  10/6/2010     take 5 
milliliters by oral route every 6 hours as needed, not to exceed 30 mL in 24 
hours   

 

 Zithromax Z-Kilo Oral Tablet 250 mg  10/6/2010  10/11/2010  take 2 tablets (500 
mg) by oral route once daily for 1 day then 1 tablet (250 mg) by oral route 
once daily for 4 days   

 

 Lortab Oral Tablet 5-500 mg  10/22/2010  10/29/2010  take 1 tablet by oral 
route every 6 hours as needed for pain for 7 days   

 

 Flagyl Oral Tablet 500 mg  10/26/2010  2010  take 1 tablet (500 mg) by 
oral route every 12 hours for 10 days   

 

 Tramadol Oral Tablet 50 mg  2010     take 1 tablet (50 mg) by oral route 
every 6 hours as needed   

 

 Tylenol-Codeine #3 Oral Tablet 300-30 mg  2010     take 1 tablet by oral 
route every 4-6 hours as needed   









 Discontinued 

 

 Name  Start Date  Discontinued Date  SIG  Comments

 

 Tylenol-Codeine #3 Oral Tablet 300-30 mg  2010  10/6/2010  take 1 - 2 
tablets by oral route every 6 hours as needed   

 

 Loratadine Oral Tablet 10 mg  10/6/2010  2017  take 1 tablet (10 mg) by 
oral route once daily   

 

 Nucynta Oral Tablet 100 mg  2010  take 1 tablet (100 mg) by 
oral route every 6 hours as needed  Too expensive







Problem List

Not available.



Vital Signs







 Date  Time  BP-Sys(mm[Hg]  BP-Trisha(mm[Hg])  HR(bpm)  RR(rpm)  Temp  WT  HT  HC  
BMI  BSA  BMI Percentile  O2 Sat(%)

 

 2017  6:57:00 PM  120 mmHg  80 mmHg  130 bpm     98.8 F  187 lbs  65 in  
   31.12 kg/m2  1.97 m2     99 %

 

 10/26/2010  11:06:00 AM  120 mmHg  79 mmHg  92 bpm     98.2 F                 
    

 

 10/6/2010  12:50:00 PM  100 mmHg  60 mmHg  80 bpm  16 rpm  98.1 F  152 lbs    
              

 

 2010  9:47:00 AM  125 mmHg  77 mmHg  86 bpm     98.1 F                   
  

 

 2010  1:32:00 PM  114 mmHg  69 mmHg  97 bpm     97.7 F  150.5 lbs  59.5 
in     29.89 kg/m2  1.69 m2      

 

 2010  12:12:00 PM  104 mmHg  60 mmHg  88 bpm  18 rpm  98 F  147.187 lbs  
59.5 in     29.2304 kg/m  1.6741 m      

 

 2010  9:49:00 AM  102 mmHg  62 mmHg  104 bpm  20 rpm  97.2 F  151.375 lbs
                  

 

 2010  8:36:00 AM  120 mmHg  70 mmHg           158.5 lbs                  







Social History







 Name  Description  Comments

 

 Tobacco  Current every day smoker   

 

 Alcohol Use - Occasional      

 

 lives with family member     aunt Simi Tavarez

 

 lives at home      

 

 Tobacco Use     3-4 cigs daily - Quit 7/10







History of Procedures







 Date Ordered  Description  Order Status

 

 2010 12:00 AM  US EXAM PELVIC COMPLETE  Reviewed

 

 2010 12:00 AM  THER/PROPH/DIAG INJ SC/IM  Reviewed

 

 2010 12:00 AM  Rocephin, Per 250MG - 250MG Vial  - NDC 4989-2395-28  
Reviewed

 

 2010 12:00 AM  SPECIMEN HANDLING OFFICE-LAB  Reviewed

 

 2010 12:00 AM  CHLAMYDIA CULTURE  Reviewed

 

 2010 12:00 AM  N.GONORRHOEAE DNA AMP PROB  Reviewed

 

 10/26/2010 12:00 AM  CULTURE OTHR SPECIMN AEROBIC  Reviewed

 

 10/26/2010 12:00 AM  CHYLMD TRACH DNA AMP PROBE  Reviewed

 

 10/26/2010 12:00 AM  N.GONORRHOEAE DNA AMP PROB  Reviewed







Results Summary







 Date and Description  Results

 

 10/26/2010 4:55 PM   NORMAL 







History Of Immunizations

Not available.



History of Past Illness







 Name  Date of Onset  Comments

 

 Anxiety      

 

 depression      

 

 Obesity  2010  8:36AM   

 

 Anxiety Disorder  2010  8:36AM   

 

 Depressive Disorder  2010  8:36AM   

 

 Panic Disorder  2010  8:36AM   

 

 Contraception, Oral Prescription  2010  8:36AM   

 

 Dysmenorrhea  2010  8:36AM   

 

 Family Planning  2010  8:36AM   

 

 Dysmenorrhea  2010  9:50AM   

 

 Pharyngitis, Acute  Aug  2 2010 12:17PM   

 

 Otitis Externa, Acute  Aug  2 2010 12:17PM   

 

 Dysmenorrhea  Aug 11 2010  1:36PM   

 

 Pelvic Inflammatory Disease  Aug 11 2010  1:36PM   

 

 Pelvic Inflammatory Disease  Aug 11 2010  4:14PM   

 

 Pelvic Inflammatory Disease  Aug 16 2010 10:07AM   

 

 Cough  Oct  6 2010 12:53PM   

 

 Rhinitis, Allergic  Oct  6 2010 12:53PM   

 

 Upper Respiratory Infections  Oct  6 2010 12:53PM   

 

 Pelvic Pain  Oct 26 2010 11:08AM   

 

 Abscess of left thigh  Dec  4 2017  7:04PM   

 

 Abscess of right thigh  Dec  4 2017  7:04PM   







Payers







 Insurance Name  Company Name  Plan Name  Plan Number  Policy Number  Policy 
Group Number  Start Date

 

    [employer]     [id number]     2009







History of Encounters







 Visit Date  Visit Type  Provider

 

 2017  Office visit  Davidson MAE

 

 10/26/2010  Office visit  Jeniffer Gonzalez MD

 

 10/6/2010  Office visit  Glenna Canela MD

 

 2010  Office visit  Jeniffer Gonzalez MD

 

 2010  Office visit  Jeniffer Gonzalez MD

 

 2010  Office visit  Tamela SHI

 

 2010  Office visit  Tamela SHI

 

 2010  Office visit  Jalil Kee DO

 

 11/10/2009  Office visit  Glenna Canela MD

 

 9/10/2009  Office visit  Glenna Canela MD

## 2018-06-02 NOTE — XMS REPORT
MU2 Ambulatory Summary

 Created on: 2017



Amie Tavarez

External Reference #: [id number]

: 1990

Sex: Female



Demographics







 Address  [address] [place]  [address]

 

 Home Phone  [phone]

 

 Preferred Language  English

 

 Marital Status  Never 

 

 Hindu Affiliation  Unknown

 

 Race  White

 

 Ethnic Group  Not  or 





Author







 Author  Davidson Howard

 

 [facility]

 

 Address  [address]

[place], [place]  [address]



 

 Phone  [phone]







Care Team Providers







 Care Team Member Name  Role  Phone

 

 Davidson Howard  PCP  [phone]







Allergies and Adverse Reactions







 Name  Reaction  Notes

 

 PENICILLINS      

 

 tramadol      







Plan of Treatment

Not available.



Medications







 Active 

 

 Name  Start Date  Estimated Completion Date  SIG  Comments

 

 Klonopin 1 mg oral tablet        take 1 tablet by oral route daily   

 

 doxycycline hyclate 100 mg oral capsule  2017     take 1 capsule (100 mg) 
by oral route 2 times per day for 10 days   









  

 

 Name  Start Date  Expiration Date  SIG  Comments

 

 Celexa Oral Tablet 20 mg  2010  10/12/2010  take 1 tablet (20 mg) by oral 
route once daily for 30 days   

 

 Ocella Oral Tablet 3-0.03 mg  6/15/2010  2010  take 1 tablet by oral 
route once daily for 28 days   

 

 Xanax Oral Tablet 1 mg  2010  10/12/2010  take 1 tablet by oral route 2 
times a day for 30 days   

 

 Cortisporin Otic Drops, Suspension 3.5-10,000-1 mg-unit/mL-%  2010  instill 4 drops into right ear by otic route 3 times per day for 7 days   

 

 Sprintec (28) Oral Tablet 0.25-35 mg-mcg  2010  take 1 tablet 
by oral route once daily for 28 days   

 

 Flagyl Oral Tablet 500 mg  2010  take 1 tablet (500 mg) by 
oral route every 12 hours for 7 days   

 

 Ceftriaxone Injection Recon Soln 250 mg  2010  inject 250 mg 
by intramuscular route as a single dose   

 

 Doxycycline Hyclate Oral Tablet 100 mg  2010  take 1 tablet (
100 mg) by oral route every 12 hours for 14 days   

 

 Promethazine-Codeine Oral Syrup 6.25-10 mg/5 mL  10/6/2010     take 5 
milliliters by oral route every 6 hours as needed, not to exceed 30 mL in 24 
hours   

 

 Zithromax Z-Kilo Oral Tablet 250 mg  10/6/2010  10/11/2010  take 2 tablets (500 
mg) by oral route once daily for 1 day then 1 tablet (250 mg) by oral route 
once daily for 4 days   

 

 Lortab Oral Tablet 5-500 mg  10/22/2010  10/29/2010  take 1 tablet by oral 
route every 6 hours as needed for pain for 7 days   

 

 Flagyl Oral Tablet 500 mg  10/26/2010  2010  take 1 tablet (500 mg) by 
oral route every 12 hours for 10 days   

 

 Tramadol Oral Tablet 50 mg  2010     take 1 tablet (50 mg) by oral route 
every 6 hours as needed   

 

 Tylenol-Codeine #3 Oral Tablet 300-30 mg  2010     take 1 tablet by oral 
route every 4-6 hours as needed   









 Discontinued 

 

 Name  Start Date  Discontinued Date  SIG  Comments

 

 Tylenol-Codeine #3 Oral Tablet 300-30 mg  2010  10/6/2010  take 1 - 2 
tablets by oral route every 6 hours as needed   

 

 Loratadine Oral Tablet 10 mg  10/6/2010  2017  take 1 tablet (10 mg) by 
oral route once daily   

 

 Nucynta Oral Tablet 100 mg  2010  take 1 tablet (100 mg) by 
oral route every 6 hours as needed  Too expensive







Problem List

Not available.



Vital Signs







 Date  Time  BP-Sys(mm[Hg]  BP-Trisha(mm[Hg])  HR(bpm)  RR(rpm)  Temp  WT  HT  HC  
BMI  BSA  BMI Percentile  O2 Sat(%)

 

 2017  6:57:00 PM  120 mmHg  80 mmHg  130 bpm     98.8 F  187 lbs  65 in  
   31.12 kg/m2  1.97 m2     99 %

 

 10/26/2010  11:06:00 AM  120 mmHg  79 mmHg  92 bpm     98.2 F                 
    

 

 10/6/2010  12:50:00 PM  100 mmHg  60 mmHg  80 bpm  16 rpm  98.1 F  152 lbs    
              

 

 2010  9:47:00 AM  125 mmHg  77 mmHg  86 bpm     98.1 F                   
  

 

 2010  1:32:00 PM  114 mmHg  69 mmHg  97 bpm     97.7 F  150.5 lbs  59.5 
in     29.89 kg/m2  1.69 m2      

 

 2010  12:12:00 PM  104 mmHg  60 mmHg  88 bpm  18 rpm  98 F  147.187 lbs  
59.5 in     29.2304 kg/m  1.6741 m      

 

 2010  9:49:00 AM  102 mmHg  62 mmHg  104 bpm  20 rpm  97.2 F  151.375 lbs
                  

 

 2010  8:36:00 AM  120 mmHg  70 mmHg           158.5 lbs                  







Social History







 Name  Description  Comments

 

 Tobacco  Current every day smoker   

 

 Alcohol Use - Occasional      

 

 lives with family member     aunt Simi Tavarez

 

 lives at home      

 

 Tobacco Use     3-4 cigs daily - Quit 7/10







History of Procedures







 Date Ordered  Description  Order Status

 

 2017 11:33 AM  URINE PREGNANCY TEST  Reviewed

 

 2010 12:00 AM  US EXAM PELVIC COMPLETE  Reviewed

 

 2010 12:00 AM  THER/PROPH/DIAG INJ SC/IM  Reviewed

 

 2010 12:00 AM  Rocephin, Per 250MG - 250MG Vial  - NDC 7409-7937-34  
Reviewed

 

 2010 12:00 AM  SPECIMEN HANDLING OFFICE-LAB  Reviewed

 

 2010 12:00 AM  CHLAMYDIA CULTURE  Reviewed

 

 2010 12:00 AM  N.GONORRHOEAE DNA AMP PROB  Reviewed

 

 10/26/2010 12:00 AM  CULTURE OTHR SPECIMN AEROBIC  Reviewed

 

 10/26/2010 12:00 AM  CHYLMD TRACH DNA AMP PROBE  Reviewed

 

 10/26/2010 12:00 AM  N.GONORRHOEAE DNA AMP PROB  Reviewed







Results Summary







 Date and Description  Results

 

 10/26/2010 4:55 PM   NORMAL 

 

 2017 7:00 PM  Pregnancy Test, Urine neg 







History Of Immunizations

Not available.



History of Past Illness







 Name  Date of Onset  Comments

 

 Anxiety      

 

 depression      

 

 Obesity  2010  8:36AM   

 

 Anxiety Disorder  2010  8:36AM   

 

 Depressive Disorder  2010  8:36AM   

 

 Panic Disorder  2010  8:36AM   

 

 Contraception, Oral Prescription  2010  8:36AM   

 

 Dysmenorrhea  2010  8:36AM   

 

 Family Planning  2010  8:36AM   

 

 Dysmenorrhea  2010  9:50AM   

 

 Pharyngitis, Acute  Aug  2 2010 12:17PM   

 

 Otitis Externa, Acute  Aug  2 2010 12:17PM   

 

 Dysmenorrhea  Aug 11 2010  1:36PM   

 

 Pelvic Inflammatory Disease  Aug 11 2010  1:36PM   

 

 Pelvic Inflammatory Disease  Aug 11 2010  4:14PM   

 

 Pelvic Inflammatory Disease  Aug 16 2010 10:07AM   

 

 Cough  Oct  6 2010 12:53PM   

 

 Rhinitis, Allergic  Oct  6 2010 12:53PM   

 

 Upper Respiratory Infections  Oct  6 2010 12:53PM   

 

 Pelvic Pain  Oct 26 2010 11:08AM   

 

 Abscess of left thigh  Dec  4 2017  7:04PM   

 

 Abscess of right thigh  Dec  4 2017  7:04PM   







Payers







 Insurance Name  Company Name  Plan Name  Plan Number  Policy Number  Policy 
Group Number  Start Date

 

    [employer]BS  [employer] [place]     [id number]     2009







History of Encounters







 Visit Date  Visit Type  Provider

 

 2017  Office visit  Davidson Howard APRN

 

 10/26/2010  Office visit  Jeniffer Gonzalez MD

 

 10/6/2010  Office visit  Glenna Canela MD

 

 2010  Office visit  Jeniffer Gonzalez MD

 

 2010  Office visit  Jeniffer Gonzalez MD

 

 2010  Office visit  Tamela SHI

 

 2010  Office visit  Tamela SHI

 

 2010  Office visit  Jalil Kee DO

 

 11/10/2009  Office visit  Glenna Canela MD

 

 9/10/2009  Office visit  Glenna Canela MD